# Patient Record
Sex: MALE | Race: BLACK OR AFRICAN AMERICAN | NOT HISPANIC OR LATINO | URBAN - METROPOLITAN AREA
[De-identification: names, ages, dates, MRNs, and addresses within clinical notes are randomized per-mention and may not be internally consistent; named-entity substitution may affect disease eponyms.]

---

## 2020-02-12 ENCOUNTER — EMERGENCY (EMERGENCY)
Facility: HOSPITAL | Age: 25
LOS: 0 days | Discharge: HOME | End: 2020-02-13
Attending: EMERGENCY MEDICINE
Payer: COMMERCIAL

## 2020-02-12 VITALS
RESPIRATION RATE: 18 BRPM | DIASTOLIC BLOOD PRESSURE: 65 MMHG | OXYGEN SATURATION: 94 % | SYSTOLIC BLOOD PRESSURE: 104 MMHG | HEART RATE: 106 BPM | HEIGHT: 70 IN | WEIGHT: 175.05 LBS | TEMPERATURE: 101 F

## 2020-02-12 DIAGNOSIS — R50.9 FEVER, UNSPECIFIED: ICD-10-CM

## 2020-02-12 DIAGNOSIS — R51 HEADACHE: ICD-10-CM

## 2020-02-12 DIAGNOSIS — J11.1 INFLUENZA DUE TO UNIDENTIFIED INFLUENZA VIRUS WITH OTHER RESPIRATORY MANIFESTATIONS: ICD-10-CM

## 2020-02-12 DIAGNOSIS — R05 COUGH: ICD-10-CM

## 2020-02-12 PROCEDURE — 71046 X-RAY EXAM CHEST 2 VIEWS: CPT

## 2020-02-12 PROCEDURE — 99284 EMERGENCY DEPT VISIT MOD MDM: CPT

## 2020-02-12 PROCEDURE — 96374 THER/PROPH/DIAG INJ IV PUSH: CPT

## 2020-02-12 PROCEDURE — 99284 EMERGENCY DEPT VISIT MOD MDM: CPT | Mod: 25

## 2020-02-12 PROCEDURE — 86703 HIV-1/HIV-2 1 RESULT ANTBDY: CPT

## 2020-02-12 PROCEDURE — 96361 HYDRATE IV INFUSION ADD-ON: CPT

## 2020-02-12 PROCEDURE — 36415 COLL VENOUS BLD VENIPUNCTURE: CPT

## 2020-02-12 PROCEDURE — 87631 RESP VIRUS 3-5 TARGETS: CPT

## 2020-02-13 VITALS
SYSTOLIC BLOOD PRESSURE: 114 MMHG | RESPIRATION RATE: 19 BRPM | HEART RATE: 94 BPM | TEMPERATURE: 99 F | OXYGEN SATURATION: 100 % | DIASTOLIC BLOOD PRESSURE: 66 MMHG

## 2020-02-13 LAB
FLU A RESULT: DETECTED
FLU A RESULT: DETECTED
FLUAV AG NPH QL: DETECTED
FLUBV AG NPH QL: SIGNIFICANT CHANGE UP
HIV 1 & 2 AB SERPL IA.RAPID: SIGNIFICANT CHANGE UP
RSV RESULT: SIGNIFICANT CHANGE UP
RSV RNA RESP QL NAA+PROBE: SIGNIFICANT CHANGE UP

## 2020-02-13 PROCEDURE — 71046 X-RAY EXAM CHEST 2 VIEWS: CPT | Mod: 26

## 2020-02-13 RX ORDER — KETOROLAC TROMETHAMINE 30 MG/ML
30 SYRINGE (ML) INJECTION ONCE
Refills: 0 | Status: DISCONTINUED | OUTPATIENT
Start: 2020-02-13 | End: 2020-02-13

## 2020-02-13 RX ORDER — SODIUM CHLORIDE 9 MG/ML
1000 INJECTION INTRAMUSCULAR; INTRAVENOUS; SUBCUTANEOUS ONCE
Refills: 0 | Status: COMPLETED | OUTPATIENT
Start: 2020-02-13 | End: 2020-02-13

## 2020-02-13 RX ORDER — ACETAMINOPHEN 500 MG
975 TABLET ORAL ONCE
Refills: 0 | Status: COMPLETED | OUTPATIENT
Start: 2020-02-13 | End: 2020-02-13

## 2020-02-13 RX ADMIN — Medication 30 MILLIGRAM(S): at 01:22

## 2020-02-13 RX ADMIN — SODIUM CHLORIDE 1000 MILLILITER(S): 9 INJECTION INTRAMUSCULAR; INTRAVENOUS; SUBCUTANEOUS at 02:20

## 2020-02-13 RX ADMIN — Medication 975 MILLIGRAM(S): at 02:20

## 2020-02-13 RX ADMIN — Medication 30 MILLIGRAM(S): at 02:00

## 2020-02-13 RX ADMIN — Medication 975 MILLIGRAM(S): at 01:22

## 2020-02-13 RX ADMIN — SODIUM CHLORIDE 1000 MILLILITER(S): 9 INJECTION INTRAMUSCULAR; INTRAVENOUS; SUBCUTANEOUS at 01:22

## 2020-02-13 NOTE — ED PROVIDER NOTE - CPE EDP SKIN NORM
Provided SBIRT services: Full screen positive. Referral to Treatment Performed.   Screening results were reviewed with the patient and patient was provided information about healthy guidelines and potential negative consequences associated with level of risk. Motivation and readiness to reduce or stop use was discussed and goals and activities to make changes were suggested/offered. Referral for complete assessment and level of care determination at a certified treatment facility was completed by contacting the treatment facility via phone, [facility] central intake, [address] [place] [address], [phone]. Treatment facility recommended that patient proceed to central intake today for an intake appointment. Patient was provided this information and was in agreement with the plan.   Audit Score: 8  DAST Score: 7  Duration = # 45 Minutes normal...

## 2020-02-13 NOTE — ED ADULT NURSE NOTE - OBJECTIVE STATEMENT
pt comes to ED for evaluation of flu like symptoms and fever, pt complaining of sore throat, lower back pain, general malaise, pt states he was febrile at home took 325mg tylenol PTA, No abdominal pain, no n/v/d.  no urinary complaints.  No ear pain.  Patient denies smoking, drug use, no IV drugs.  Drinks wine on occasion

## 2020-02-13 NOTE — ED PROVIDER NOTE - MUSCULOSKELETAL, MLM
Spine appears normal, range of motion is not limited, no muscle or joint tenderness. No tenderness to palpation along spine/back.

## 2020-02-13 NOTE — ED PROVIDER NOTE - NSFOLLOWUPINSTRUCTIONS_ED_ALL_ED_FT
Follow up with your PMD this week  Stay well hydrated  Alternate Tylenol 1 gram every 6 hours with Ibuprofen 600mg every 6 hours for fever/pain  Return to ED for any further concerns    Influenza, Adult  Influenza, more commonly known as "the flu," is a viral infection that mainly affects the respiratory tract. The respiratory tract includes organs that help you breathe, such as the lungs, nose, and throat. The flu causes many symptoms similar to the common cold along with high fever and body aches.  The flu spreads easily from person to person (is contagious). Getting a flu shot (influenza vaccination) every year is the best way to prevent the flu.  What are the causes?  This condition is caused by the influenza virus. You can get the virus by:  Breathing in droplets that are in the air from an infected person's cough or sneeze.Touching something that has been exposed to the virus (has been contaminated) and then touching your mouth, nose, or eyes.What increases the risk?  The following factors may make you more likely to get the flu:  Not washing or sanitizing your hands often.Having close contact with many people during cold and flu season.Touching your mouth, eyes, or nose without first washing or sanitizing your hands.Not getting a yearly (annual) flu shot.You may have a higher risk for the flu, including serious problems such as a lung infection (pneumonia), if you:  Are older than 65.Are pregnant.Have a weakened disease-fighting system (immune system). You may have a weakened immune system if you:  Have HIV or AIDS.Are undergoing chemotherapy.Are taking medicines that reduce (suppress) the activity of your immune system.Have a long-term (chronic) illness, such as heart disease, kidney disease, diabetes, or lung disease.Have a liver disorder.Are severely overweight (morbidly obese).Have anemia. This is a condition that affects your red blood cells.Have asthma.What are the signs or symptoms?  Symptoms of this condition usually begin suddenly and last 4–14 days. They may include:  Fever and chills.Headaches, body aches, or muscle aches.Sore throat.Cough.Runny or stuffy (congested) nose.Chest discomfort.Poor appetite.Weakness or fatigue.Dizziness.Nausea or vomiting.How is this diagnosed?  This condition may be diagnosed based on:  Your symptoms and medical history.A physical exam. Swabbing your nose or throat and testing the fluid for the influenza virus.How is this treated?  If the flu is diagnosed early, you can be treated with medicine that can help reduce how severe the illness is and how long it lasts (antiviral medicine). This may be given by mouth (orally) or through an IV.  Taking care of yourself at home can help relieve symptoms. Your health care provider may recommend:  Taking over-the-counter medicines.Drinking plenty of fluids.In many cases, the flu goes away on its own. If you have severe symptoms or complications, you may be treated in a hospital.  Follow these instructions at home:  Activity     Rest as needed and get plenty of sleep.Stay home from work or school as told by your health care provider. Unless you are visiting your health care provider, avoid leaving home until your fever has been gone for 24 hours without taking medicine.Eating and drinking     Take an oral rehydration solution (ORS). This is a drink that is sold at pharmacies and retail stores.Drink enough fluid to keep your urine pale yellow.Drink clear fluids in small amounts as you are able. Clear fluids include water, ice chips, diluted fruit juice, and low-calorie sports drinks.Eat bland, easy-to-digest foods in small amounts as you are able. These foods include bananas, applesauce, rice, lean meats, toast, and crackers.Avoid drinking fluids that contain a lot of sugar or caffeine, such as energy drinks, regular sports drinks, and soda.Avoid alcohol.Avoid spicy or fatty foods.General instructions               Take over-the-counter and prescription medicines only as told by your health care provider.Use a cool mist humidifier to add humidity to the air in your home. This can make it easier to breathe.Cover your mouth and nose when you cough or sneeze.Wash your hands with soap and water often, especially after you cough or sneeze. If soap and water are not available, use alcohol-based hand .Keep all follow-up visits as told by your health care provider. This is important.How is this prevented?     Get an annual flu shot. You may get the flu shot in late summer, fall, or winter. Ask your health care provider when you should get your flu shot.Avoid contact with people who are sick during cold and flu season. This is generally fall and winter.Contact a health care provider if:  You develop new symptoms.You have:  Chest pain.Diarrhea.A fever.Your cough gets worse.You produce more mucus.You feel nauseous or you vomit.Get help right away if:  You develop shortness of breath or difficulty breathing.Your skin or nails turn a bluish color.You have severe pain or stiffness in your neck.You develop a sudden headache or sudden pain in your face or ear.You cannot eat or drink without vomiting.Summary  Influenza, more commonly known as "the flu," is a viral infection that primarily affects your respiratory tract.Symptoms of the flu usually begin suddenly and last 4–14 days.Getting an annual flu shot is the best way to prevent getting the flu.Stay home from work or school as told by your health care provider. Unless you are visiting your health care provider, avoid leaving home until your fever has been gone for 24 hours without taking medicine.Keep all follow-up visits as told by your health care provider. This is important.This information is not intended to replace advice given to you by your health care provider. Make sure you discuss any questions you have with your health care provider.

## 2020-02-13 NOTE — ED PROVIDER NOTE - ENMT, MLM
Patient received HD Flu vaccine. Tolerated well and left in NAD  
Airway patent, Nasal mucosa clear. Mouth with normal mucosa. Throat has no vesicles, no oropharyngeal exudates and uvula is midline.  TMs clear.

## 2020-02-13 NOTE — ED PROVIDER NOTE - PATIENT PORTAL LINK FT
You can access the FollowMyHealth Patient Portal offered by [facility] by registering at the following website: [email or web address]. By joining [facility]’s FollowMyHealth portal, you will also be able to view your health information using other applications (apps) compatible with our system.

## 2020-02-13 NOTE — ED PROVIDER NOTE - PROGRESS NOTE DETAILS
results d/w pt.  will f/u pmd.  offered tamiflu, shared decision making, pt will pass.  motrin/tylenol/fluids and return instructions discussed

## 2020-02-13 NOTE — ED PROVIDER NOTE - OBJECTIVE STATEMENT
25 yo male with no pmh c/o fever since yesterday. Pt started to feel unwell  on Monday with fever starting yesterday. Today took one tylenol at 12noon.  Pt c/o cough, dry, nonproductive, sore throat, headache, body aches, low back pains.  No abdominal pain, no n/v/d.  no urinary complaints.  No ear pain.  Patient denies smoking, drug use, no IV drugs.  Drinks wine on occasion.  No travel.

## 2020-02-13 NOTE — ED PROVIDER NOTE - CONSTITUTIONAL, MLM
normal... Well appearing, awake, alert, oriented to person, place, time/situation and in no apparent distress.  Nontoxic appearing.

## 2021-06-17 NOTE — ED ADULT NURSE NOTE - BOWEL SOUNDS LLQ
[FreeTextEntry1] : Diet recommendation for high cholesterol\par Overall blood work  unremarkable\par Received pneumovax left deltoid present